# Patient Record
Sex: MALE | Race: WHITE | Employment: FULL TIME
[De-identification: names, ages, dates, MRNs, and addresses within clinical notes are randomized per-mention and may not be internally consistent; named-entity substitution may affect disease eponyms.]

---

## 2017-01-20 PROBLEM — I10 ESSENTIAL HYPERTENSION: Status: ACTIVE | Noted: 2017-01-20

## 2017-01-20 PROBLEM — E10.9 TYPE 1 DIABETES MELLITUS WITHOUT COMPLICATION (HCC): Status: ACTIVE | Noted: 2017-01-20

## 2020-10-14 PROBLEM — N43.40 SPERMATOCELE: Status: ACTIVE | Noted: 2020-10-14

## 2020-12-14 PROBLEM — K43.9 VENTRAL HERNIA WITHOUT OBSTRUCTION OR GANGRENE: Status: ACTIVE | Noted: 2020-12-14

## 2021-03-31 PROBLEM — N50.89 TESTICULAR SWELLING, RIGHT: Status: ACTIVE | Noted: 2021-03-31

## 2021-06-30 PROBLEM — N52.1 ERECTILE DYSFUNCTION DUE TO DISEASES CLASSIFIED ELSEWHERE: Status: ACTIVE | Noted: 2021-06-30

## 2022-09-28 PROBLEM — E10.3293 MILD NONPROLIFERATIVE DIABETIC RETINOPATHY OF BOTH EYES WITHOUT MACULAR EDEMA ASSOCIATED WITH TYPE 1 DIABETES MELLITUS (HCC): Status: ACTIVE | Noted: 2022-09-28

## 2023-05-22 ENCOUNTER — NURSE TRIAGE (OUTPATIENT)
Dept: OTHER | Facility: CLINIC | Age: 63
End: 2023-05-22

## 2023-05-22 NOTE — TELEPHONE ENCOUNTER
Current Symptoms: Pt is trying to reach Nurse Mireya De La Cruz with the Disease Management program. Call was transferred to [facility] Disease Management. Recommended disposition: Information only call. No triage. Care advice provided, patient verbalizes understanding; denies any other questions or concerns; instructed to call back for any new or worsening symptoms. This triage is a result of a call to [facility] [place]. Please do not respond to the triage nurse through this encounter. Any subsequent communication should be directly with the patient. Reason for Disposition   Information only question and nurse able to answer    Protocols used:  Information Only Call - No Triage-ADULT-[place]

## 2025-03-04 ENCOUNTER — HOSPITAL ENCOUNTER (OUTPATIENT)
Dept: HOSPITAL 100 - LAB | Age: 65
Discharge: HOME | End: 2025-03-04
Payer: COMMERCIAL

## 2025-03-04 DIAGNOSIS — L08.9: Primary | ICD-10-CM

## 2025-03-04 DIAGNOSIS — Z96.651: ICD-10-CM

## 2025-03-04 LAB
CRP SERPL-MCNC: 27.2 MG/L (ref 0–3)
DEPRECATED RDW RBC: 41.9 FL (ref 35.1–43.9)
ERYTHROCYTE [DISTWIDTH] IN BLOOD: 13 % (ref 11.6–14.6)
HCT VFR BLD AUTO: 37.2 % (ref 40–54)
HEMOGLOBIN: 12 G/DL (ref 13–16.5)
HGB BLD-MCNC: 12 G/DL (ref 13–16.5)
IMMATURE GRANULOCYTES COUNT: 0.07 X10^3/UL (ref 0–0)
MCV RBC: 87.9 FL (ref 80–94)
MEAN CORP HGB CONC: 32.3 G/DL (ref 32–36)
MEAN PLATELET VOL.: 9.8 FL (ref 6.2–12)
NRBC FLAGGED BY ANALYZER: 0 % (ref 0–5)
PLATELET # BLD: 456 K/MM3 (ref 150–450)
PLATELET COUNT: 456 K/MM3 (ref 150–450)
RBC # BLD AUTO: 4.23 M/MM3 (ref 4.6–6.2)
RBC DISTRIBUTION WIDTH CV: 13 % (ref 11.6–14.6)
RBC DISTRIBUTION WIDTH SD: 41.9 FL (ref 35.1–43.9)
WBC # BLD AUTO: 12.1 K/MM3 (ref 4.4–11)
WHITE BLOOD COUNT: 12.1 K/MM3 (ref 4.4–11)

## 2025-03-04 PROCEDURE — 87070 CULTURE OTHR SPECIMN AEROBIC: CPT

## 2025-03-04 PROCEDURE — 87206 SMEAR FLUORESCENT/ACID STAI: CPT

## 2025-03-04 PROCEDURE — 87205 SMEAR GRAM STAIN: CPT

## 2025-03-04 PROCEDURE — 87015 SPECIMEN INFECT AGNT CONCNTJ: CPT

## 2025-03-04 PROCEDURE — 86140 C-REACTIVE PROTEIN: CPT

## 2025-03-04 PROCEDURE — 85652 RBC SED RATE AUTOMATED: CPT

## 2025-03-04 PROCEDURE — 87116 MYCOBACTERIA CULTURE: CPT

## 2025-03-04 PROCEDURE — 85025 COMPLETE CBC W/AUTO DIFF WBC: CPT

## 2025-03-04 PROCEDURE — 87101 SKIN FUNGI CULTURE: CPT

## 2025-03-04 PROCEDURE — 36415 COLL VENOUS BLD VENIPUNCTURE: CPT

## 2025-03-04 PROCEDURE — 89050 BODY FLUID CELL COUNT: CPT

## 2025-03-04 PROCEDURE — 87075 CULTR BACTERIA EXCEPT BLOOD: CPT

## 2025-03-05 ENCOUNTER — HOSPITAL ENCOUNTER (INPATIENT)
Dept: HOSPITAL 100 - ED | Age: 65
LOS: 6 days | Discharge: HOME HEALTH | DRG: 467 | End: 2025-03-11
Payer: COMMERCIAL

## 2025-03-05 VITALS
OXYGEN SATURATION: 97 % | RESPIRATION RATE: 18 BRPM | TEMPERATURE: 98.8 F | HEART RATE: 85 BPM | DIASTOLIC BLOOD PRESSURE: 75 MMHG | SYSTOLIC BLOOD PRESSURE: 137 MMHG

## 2025-03-05 VITALS — BODY MASS INDEX: 32 KG/M2 | BODY MASS INDEX: 31.4 KG/M2

## 2025-03-05 VITALS
DIASTOLIC BLOOD PRESSURE: 71 MMHG | SYSTOLIC BLOOD PRESSURE: 132 MMHG | OXYGEN SATURATION: 97 % | TEMPERATURE: 98.78 F | RESPIRATION RATE: 16 BRPM | HEART RATE: 86 BPM

## 2025-03-05 VITALS
TEMPERATURE: 98.24 F | SYSTOLIC BLOOD PRESSURE: 132 MMHG | DIASTOLIC BLOOD PRESSURE: 72 MMHG | OXYGEN SATURATION: 94 % | HEART RATE: 91 BPM | RESPIRATION RATE: 16 BRPM

## 2025-03-05 VITALS
OXYGEN SATURATION: 97 % | TEMPERATURE: 98.1 F | RESPIRATION RATE: 16 BRPM | SYSTOLIC BLOOD PRESSURE: 153 MMHG | HEART RATE: 89 BPM | DIASTOLIC BLOOD PRESSURE: 77 MMHG

## 2025-03-05 VITALS
DIASTOLIC BLOOD PRESSURE: 72 MMHG | SYSTOLIC BLOOD PRESSURE: 132 MMHG | OXYGEN SATURATION: 95 % | TEMPERATURE: 98.06 F | HEART RATE: 97 BPM | RESPIRATION RATE: 16 BRPM

## 2025-03-05 VITALS
OXYGEN SATURATION: 98 % | HEART RATE: 90 BPM | SYSTOLIC BLOOD PRESSURE: 142 MMHG | DIASTOLIC BLOOD PRESSURE: 70 MMHG | TEMPERATURE: 98.6 F | RESPIRATION RATE: 18 BRPM

## 2025-03-05 DIAGNOSIS — E66.811: ICD-10-CM

## 2025-03-05 DIAGNOSIS — T84.53XA: Primary | ICD-10-CM

## 2025-03-05 DIAGNOSIS — Z79.4: ICD-10-CM

## 2025-03-05 DIAGNOSIS — Z79.82: ICD-10-CM

## 2025-03-05 DIAGNOSIS — M00.9: ICD-10-CM

## 2025-03-05 DIAGNOSIS — I10: ICD-10-CM

## 2025-03-05 DIAGNOSIS — Z79.1: ICD-10-CM

## 2025-03-05 DIAGNOSIS — Z66: ICD-10-CM

## 2025-03-05 DIAGNOSIS — D62: ICD-10-CM

## 2025-03-05 DIAGNOSIS — E11.9: ICD-10-CM

## 2025-03-05 DIAGNOSIS — E78.00: ICD-10-CM

## 2025-03-05 DIAGNOSIS — K21.9: ICD-10-CM

## 2025-03-05 DIAGNOSIS — M19.90: ICD-10-CM

## 2025-03-05 LAB
ALANINE AMINOTRANSFER ALT/SGPT: 10 U/L (ref ?–46)
ALBUMIN SERPL-MCNC: 3.6 G/DL (ref 3.4–4.8)
ALKALINE PHOSPHATASE: 77 U/L (ref 40–129)
ANION GAP: 13 (ref 5–15)
ANION GAP: 14 (ref 5–15)
APTT PPP: 29.5 SECONDS (ref 24.1–36.2)
AST(SGOT): 17 U/L (ref ?–37)
BUN SERPL-MCNC: 17 MG/DL (ref 4–19)
BUN SERPL-MCNC: 17 MG/DL (ref 4–19)
BUN/CREAT RATIO: 16.6 RATIO (ref 10–20)
BUN/CREAT RATIO: 16.6 RATIO (ref 10–20)
CALCIUM SERPL-MCNC: 9.5 MG/DL (ref 7.6–11)
CALCIUM SERPL-MCNC: 9.9 MG/DL (ref 7.6–11)
CARBON DIOXIDE: 21.6 MMOL/L (ref 21–32)
CARBON DIOXIDE: 24 MMOL/L (ref 21–32)
CHLORIDE: 94 MMOL/L (ref 98–108)
CHLORIDE: 95 MMOL/L (ref 98–108)
DEPRECATED RDW RBC: 41.6 FL (ref 35.1–43.9)
DEPRECATED RDW RBC: 41.9 FL (ref 35.1–43.9)
ERYTHROCYTE [DISTWIDTH] IN BLOOD: 13 % (ref 11.6–14.6)
ERYTHROCYTE [DISTWIDTH] IN BLOOD: 13 % (ref 11.6–14.6)
ESTIMATED CREATININE CLEARANCE: 91.99 ML/MIN (ref 50–250)
GLOBULIN: 3.7 G/DL (ref 2.2–4.2)
GLUCOSE: 188 MG/DL (ref 70–99)
GLUCOSE: 282 MG/DL (ref 70–99)
HCT VFR BLD AUTO: 35.3 % (ref 40–54)
HCT VFR BLD AUTO: 38.6 % (ref 40–54)
HEMOGLOBIN: 11.6 G/DL (ref 13–16.5)
HEMOGLOBIN: 12.5 G/DL (ref 13–16.5)
HGB BLD-MCNC: 11.6 G/DL (ref 13–16.5)
HGB BLD-MCNC: 12.5 G/DL (ref 13–16.5)
IMMATURE GRANULOCYTES COUNT: 0.1 X10^3/UL (ref 0–0)
IMMATURE GRANULOCYTES COUNT: 0.21 X10^3/UL (ref 0–0)
KETONE-DIPSTICK: 15 MG/DL
MCV RBC: 87.6 FL (ref 80–94)
MCV RBC: 87.7 FL (ref 80–94)
MEAN CORP HGB CONC: 32.4 G/DL (ref 32–36)
MEAN CORP HGB CONC: 32.9 G/DL (ref 32–36)
MEAN PLATELET VOL.: 10.1 FL (ref 6.2–12)
MEAN PLATELET VOL.: 10.2 FL (ref 6.2–12)
MUCOUS THREADS URNS QL MICRO: (no result) /HPF
NRBC FLAGGED BY ANALYZER: 0 % (ref 0–5)
NRBC FLAGGED BY ANALYZER: 0 % (ref 0–5)
PLATELET # BLD: 411 K/MM3 (ref 150–450)
PLATELET # BLD: 477 K/MM3 (ref 150–450)
PLATELET COUNT: 411 K/MM3 (ref 150–450)
PLATELET COUNT: 477 K/MM3 (ref 150–450)
POTASSIUM: 4.1 MMOL/L (ref 3.3–5.1)
POTASSIUM: 4.8 MMOL/L (ref 3.3–5.1)
PROT UR QL STRIP.AUTO: 15 MG/DL
PROTHROMBIN TIME (PROTIME)PT.: 13.6 SECONDS (ref 11.7–14.9)
RBC # BLD AUTO: 4.03 M/MM3 (ref 4.6–6.2)
RBC # BLD AUTO: 4.4 M/MM3 (ref 4.6–6.2)
RBC DISTRIBUTION WIDTH CV: 13 % (ref 11.6–14.6)
RBC DISTRIBUTION WIDTH CV: 13 % (ref 11.6–14.6)
RBC DISTRIBUTION WIDTH SD: 41.6 FL (ref 35.1–43.9)
RBC DISTRIBUTION WIDTH SD: 41.9 FL (ref 35.1–43.9)
RBC UR QL: (no result) /HPF (ref 0–5)
SP GR UR: 1.02 (ref 1–1.03)
SQUAMOUS URNS QL MICRO: (no result) /HPF (ref 0–5)
URINE PRESERVATIVE: (no result)
WBC # BLD AUTO: 11.6 K/MM3 (ref 4.4–11)
WBC # BLD AUTO: 12.7 K/MM3 (ref 4.4–11)
WHITE BLOOD COUNT: 11.6 K/MM3 (ref 4.4–11)
WHITE BLOOD COUNT: 12.7 K/MM3 (ref 4.4–11)

## 2025-03-05 PROCEDURE — 36415 COLL VENOUS BLD VENIPUNCTURE: CPT

## 2025-03-05 PROCEDURE — 80053 COMPREHEN METABOLIC PANEL: CPT

## 2025-03-05 PROCEDURE — 87205 SMEAR GRAM STAIN: CPT

## 2025-03-05 PROCEDURE — 94668 MNPJ CHEST WALL SBSQ: CPT

## 2025-03-05 PROCEDURE — 81001 URINALYSIS AUTO W/SCOPE: CPT

## 2025-03-05 PROCEDURE — 87176 TISSUE HOMOGENIZATION CULTR: CPT

## 2025-03-05 PROCEDURE — 87015 SPECIMEN INFECT AGNT CONCNTJ: CPT

## 2025-03-05 PROCEDURE — 97166 OT EVAL MOD COMPLEX 45 MIN: CPT

## 2025-03-05 PROCEDURE — G0463 HOSPITAL OUTPT CLINIC VISIT: HCPCS

## 2025-03-05 PROCEDURE — 87040 BLOOD CULTURE FOR BACTERIA: CPT

## 2025-03-05 PROCEDURE — A4216 STERILE WATER/SALINE, 10 ML: HCPCS

## 2025-03-05 PROCEDURE — 82962 GLUCOSE BLOOD TEST: CPT

## 2025-03-05 PROCEDURE — 87077 CULTURE AEROBIC IDENTIFY: CPT

## 2025-03-05 PROCEDURE — 99285 EMERGENCY DEPT VISIT HI MDM: CPT

## 2025-03-05 PROCEDURE — 88305 TISSUE EXAM BY PATHOLOGIST: CPT

## 2025-03-05 PROCEDURE — 80202 ASSAY OF VANCOMYCIN: CPT

## 2025-03-05 PROCEDURE — 87086 URINE CULTURE/COLONY COUNT: CPT

## 2025-03-05 PROCEDURE — 87070 CULTURE OTHR SPECIMN AEROBIC: CPT

## 2025-03-05 PROCEDURE — 97530 THERAPEUTIC ACTIVITIES: CPT

## 2025-03-05 PROCEDURE — 85610 PROTHROMBIN TIME: CPT

## 2025-03-05 PROCEDURE — 97110 THERAPEUTIC EXERCISES: CPT

## 2025-03-05 PROCEDURE — 36569 INSJ PICC 5 YR+ W/O IMAGING: CPT

## 2025-03-05 PROCEDURE — 99252 IP/OBS CONSLTJ NEW/EST SF 35: CPT

## 2025-03-05 PROCEDURE — 80048 BASIC METABOLIC PNL TOTAL CA: CPT

## 2025-03-05 PROCEDURE — 87116 MYCOBACTERIA CULTURE: CPT

## 2025-03-05 PROCEDURE — 83036 HEMOGLOBIN GLYCOSYLATED A1C: CPT

## 2025-03-05 PROCEDURE — 87206 SMEAR FLUORESCENT/ACID STAI: CPT

## 2025-03-05 PROCEDURE — 85730 THROMBOPLASTIN TIME PARTIAL: CPT

## 2025-03-05 PROCEDURE — C1776 JOINT DEVICE (IMPLANTABLE): HCPCS

## 2025-03-05 PROCEDURE — 93005 ELECTROCARDIOGRAM TRACING: CPT

## 2025-03-05 PROCEDURE — 83605 ASSAY OF LACTIC ACID: CPT

## 2025-03-05 PROCEDURE — 97162 PT EVAL MOD COMPLEX 30 MIN: CPT

## 2025-03-05 PROCEDURE — 87075 CULTR BACTERIA EXCEPT BLOOD: CPT

## 2025-03-05 PROCEDURE — 97802 MEDICAL NUTRITION INDIV IN: CPT

## 2025-03-05 PROCEDURE — 73564 X-RAY EXAM KNEE 4 OR MORE: CPT

## 2025-03-05 PROCEDURE — 85025 COMPLETE CBC W/AUTO DIFF WBC: CPT

## 2025-03-05 PROCEDURE — 87102 FUNGUS ISOLATION CULTURE: CPT

## 2025-03-05 PROCEDURE — 97535 SELF CARE MNGMENT TRAINING: CPT

## 2025-03-05 PROCEDURE — 97116 GAIT TRAINING THERAPY: CPT

## 2025-03-05 RX ADMIN — PIPERACILLIN SODIUM AND TAZOBACTAM SODIUM 12.5 GM: 3; .375 INJECTION, POWDER, LYOPHILIZED, FOR SOLUTION INTRAVENOUS at 21:18

## 2025-03-05 RX ADMIN — PIPERACILLIN SODIUM AND TAZOBACTAM SODIUM 100 GM: 3; .375 INJECTION, POWDER, LYOPHILIZED, FOR SOLUTION INTRAVENOUS at 12:22

## 2025-03-05 RX ADMIN — INSULIN GLARGINE-YFGN 55 UNIT: 100 INJECTION, SOLUTION SUBCUTANEOUS at 21:41

## 2025-03-05 RX ADMIN — VANCOMYCIN HYDROCHLORIDE 250 MG: 1 INJECTION, POWDER, LYOPHILIZED, FOR SOLUTION INTRAVENOUS at 13:26

## 2025-03-06 VITALS
HEART RATE: 79 BPM | DIASTOLIC BLOOD PRESSURE: 72 MMHG | SYSTOLIC BLOOD PRESSURE: 128 MMHG | OXYGEN SATURATION: 98 % | RESPIRATION RATE: 16 BRPM

## 2025-03-06 VITALS
HEART RATE: 81 BPM | DIASTOLIC BLOOD PRESSURE: 72 MMHG | OXYGEN SATURATION: 98 % | SYSTOLIC BLOOD PRESSURE: 126 MMHG | RESPIRATION RATE: 16 BRPM

## 2025-03-06 VITALS
SYSTOLIC BLOOD PRESSURE: 137 MMHG | TEMPERATURE: 98.42 F | OXYGEN SATURATION: 94 % | HEART RATE: 81 BPM | DIASTOLIC BLOOD PRESSURE: 74 MMHG | RESPIRATION RATE: 16 BRPM

## 2025-03-06 VITALS
RESPIRATION RATE: 16 BRPM | DIASTOLIC BLOOD PRESSURE: 72 MMHG | HEART RATE: 87 BPM | OXYGEN SATURATION: 97 % | TEMPERATURE: 98.6 F | SYSTOLIC BLOOD PRESSURE: 128 MMHG

## 2025-03-06 VITALS
RESPIRATION RATE: 16 BRPM | DIASTOLIC BLOOD PRESSURE: 72 MMHG | HEART RATE: 82 BPM | TEMPERATURE: 98.06 F | OXYGEN SATURATION: 100 % | SYSTOLIC BLOOD PRESSURE: 134 MMHG

## 2025-03-06 VITALS
SYSTOLIC BLOOD PRESSURE: 140 MMHG | RESPIRATION RATE: 16 BRPM | OXYGEN SATURATION: 99 % | TEMPERATURE: 97.8 F | DIASTOLIC BLOOD PRESSURE: 74 MMHG | HEART RATE: 74 BPM

## 2025-03-06 VITALS
SYSTOLIC BLOOD PRESSURE: 108 MMHG | TEMPERATURE: 98.6 F | RESPIRATION RATE: 16 BRPM | DIASTOLIC BLOOD PRESSURE: 64 MMHG | OXYGEN SATURATION: 98 % | HEART RATE: 77 BPM

## 2025-03-06 VITALS
OXYGEN SATURATION: 97 % | HEART RATE: 76 BPM | DIASTOLIC BLOOD PRESSURE: 72 MMHG | TEMPERATURE: 96.3 F | HEART RATE: 73 BPM | DIASTOLIC BLOOD PRESSURE: 64 MMHG | RESPIRATION RATE: 18 BRPM | OXYGEN SATURATION: 98 % | RESPIRATION RATE: 16 BRPM | SYSTOLIC BLOOD PRESSURE: 128 MMHG | SYSTOLIC BLOOD PRESSURE: 108 MMHG

## 2025-03-06 VITALS
DIASTOLIC BLOOD PRESSURE: 72 MMHG | HEART RATE: 87 BPM | TEMPERATURE: 98.6 F | OXYGEN SATURATION: 97 % | SYSTOLIC BLOOD PRESSURE: 128 MMHG | RESPIRATION RATE: 16 BRPM

## 2025-03-06 VITALS
DIASTOLIC BLOOD PRESSURE: 72 MMHG | RESPIRATION RATE: 16 BRPM | OXYGEN SATURATION: 97 % | SYSTOLIC BLOOD PRESSURE: 128 MMHG | HEART RATE: 74 BPM

## 2025-03-06 VITALS
DIASTOLIC BLOOD PRESSURE: 91 MMHG | SYSTOLIC BLOOD PRESSURE: 158 MMHG | RESPIRATION RATE: 16 BRPM | HEART RATE: 97 BPM | OXYGEN SATURATION: 97 % | TEMPERATURE: 98.24 F

## 2025-03-06 VITALS
RESPIRATION RATE: 16 BRPM | SYSTOLIC BLOOD PRESSURE: 122 MMHG | DIASTOLIC BLOOD PRESSURE: 72 MMHG | HEART RATE: 75 BPM | OXYGEN SATURATION: 98 %

## 2025-03-06 VITALS
OXYGEN SATURATION: 97 % | HEART RATE: 80 BPM | SYSTOLIC BLOOD PRESSURE: 126 MMHG | TEMPERATURE: 98.5 F | RESPIRATION RATE: 18 BRPM | DIASTOLIC BLOOD PRESSURE: 70 MMHG

## 2025-03-06 VITALS
DIASTOLIC BLOOD PRESSURE: 72 MMHG | OXYGEN SATURATION: 98 % | RESPIRATION RATE: 16 BRPM | HEART RATE: 74 BPM | SYSTOLIC BLOOD PRESSURE: 128 MMHG

## 2025-03-06 LAB
ANION GAP: 11 (ref 5–15)
BUN SERPL-MCNC: 15 MG/DL (ref 4–19)
BUN/CREAT RATIO: 14.5 RATIO (ref 10–20)
CALCIUM SERPL-MCNC: 9.3 MG/DL (ref 7.6–11)
CARBON DIOXIDE: 25 MMOL/L (ref 21–32)
CHLORIDE: 97 MMOL/L (ref 98–108)
DEPRECATED RDW RBC: 42.2 FL (ref 35.1–43.9)
DIFFERENTIAL INDICATED: (no result)
ERYTHROCYTE [DISTWIDTH] IN BLOOD: 13.2 % (ref 11.6–14.6)
ESTIMATED CREATININE CLEARANCE: 88.55 ML/MIN (ref 50–250)
GLUCOSE: 199 MG/DL (ref 70–99)
HCT VFR BLD AUTO: 34.8 % (ref 40–54)
HEMOGLOBIN: 11.4 G/DL (ref 13–16.5)
HGB BLD-MCNC: 11.4 G/DL (ref 13–16.5)
IMMATURE GRANULOCYTES COUNT: 0.06 X10^3/UL (ref 0–0)
MCV RBC: 87.4 FL (ref 80–94)
MEAN CORP HGB CONC: 32.8 G/DL (ref 32–36)
MEAN PLATELET VOL.: 9.8 FL (ref 6.2–12)
NRBC FLAGGED BY ANALYZER: 0 % (ref 0–5)
PLATELET # BLD: 384 K/MM3 (ref 150–450)
PLATELET COUNT: 384 K/MM3 (ref 150–450)
POTASSIUM: 4.6 MMOL/L (ref 3.3–5.1)
RBC # BLD AUTO: 3.98 M/MM3 (ref 4.6–6.2)
RBC DISTRIBUTION WIDTH CV: 13.2 % (ref 11.6–14.6)
RBC DISTRIBUTION WIDTH SD: 42.2 FL (ref 35.1–43.9)
WBC # BLD AUTO: 10.9 K/MM3 (ref 4.4–11)
WHITE BLOOD COUNT: 10.9 K/MM3 (ref 4.4–11)

## 2025-03-06 PROCEDURE — 0SRC0JZ REPLACEMENT OF RIGHT KNEE JOINT WITH SYNTHETIC SUBSTITUTE, OPEN APPROACH: ICD-10-PCS | Performed by: ORTHOPAEDIC SURGERY

## 2025-03-06 RX ADMIN — INSULIN GLARGINE-YFGN 55 UNIT: 100 INJECTION, SOLUTION SUBCUTANEOUS at 21:47

## 2025-03-06 RX ADMIN — PIPERACILLIN SODIUM AND TAZOBACTAM SODIUM 12.5 GM: 3; .375 INJECTION, POWDER, LYOPHILIZED, FOR SOLUTION INTRAVENOUS at 19:22

## 2025-03-06 RX ADMIN — VANCOMYCIN HYDROCHLORIDE 1000 MG: 1 INJECTION, POWDER, LYOPHILIZED, FOR SOLUTION INTRAVENOUS at 15:30

## 2025-03-06 RX ADMIN — VANCOMYCIN HYDROCHLORIDE 250 MG: 1 INJECTION, POWDER, LYOPHILIZED, FOR SOLUTION INTRAVENOUS at 01:39

## 2025-03-06 RX ADMIN — PIPERACILLIN SODIUM AND TAZOBACTAM SODIUM 12.5 GM: 3; .375 INJECTION, POWDER, LYOPHILIZED, FOR SOLUTION INTRAVENOUS at 06:05

## 2025-03-06 RX ADMIN — TRANEXAMIC ACID 0 MG: 100 INJECTION, SOLUTION INTRAVENOUS at 14:42

## 2025-03-06 RX ADMIN — VANCOMYCIN HYDROCHLORIDE 250 MG: 1 INJECTION, POWDER, LYOPHILIZED, FOR SOLUTION INTRAVENOUS at 14:03

## 2025-03-06 RX ADMIN — SODIUM CHLORIDE, PRESERVATIVE FREE 0 ML: 5 INJECTION INTRAVENOUS at 23:09

## 2025-03-06 RX ADMIN — PIPERACILLIN SODIUM AND TAZOBACTAM SODIUM 12.5 GM: 3; .375 INJECTION, POWDER, LYOPHILIZED, FOR SOLUTION INTRAVENOUS at 23:08

## 2025-03-07 VITALS
DIASTOLIC BLOOD PRESSURE: 73 MMHG | HEART RATE: 94 BPM | SYSTOLIC BLOOD PRESSURE: 137 MMHG | RESPIRATION RATE: 16 BRPM | TEMPERATURE: 98.42 F | OXYGEN SATURATION: 94 %

## 2025-03-07 VITALS
HEART RATE: 87 BPM | DIASTOLIC BLOOD PRESSURE: 67 MMHG | TEMPERATURE: 98.1 F | RESPIRATION RATE: 18 BRPM | SYSTOLIC BLOOD PRESSURE: 122 MMHG | OXYGEN SATURATION: 97 %

## 2025-03-07 VITALS
SYSTOLIC BLOOD PRESSURE: 134 MMHG | HEART RATE: 84 BPM | TEMPERATURE: 97.7 F | RESPIRATION RATE: 18 BRPM | OXYGEN SATURATION: 100 % | DIASTOLIC BLOOD PRESSURE: 72 MMHG

## 2025-03-07 VITALS
OXYGEN SATURATION: 98 % | HEART RATE: 85 BPM | DIASTOLIC BLOOD PRESSURE: 68 MMHG | RESPIRATION RATE: 18 BRPM | SYSTOLIC BLOOD PRESSURE: 141 MMHG | TEMPERATURE: 97.8 F

## 2025-03-07 VITALS
RESPIRATION RATE: 18 BRPM | HEART RATE: 88 BPM | OXYGEN SATURATION: 100 % | TEMPERATURE: 98.78 F | DIASTOLIC BLOOD PRESSURE: 75 MMHG | SYSTOLIC BLOOD PRESSURE: 130 MMHG

## 2025-03-07 VITALS
TEMPERATURE: 98 F | DIASTOLIC BLOOD PRESSURE: 67 MMHG | HEART RATE: 82 BPM | RESPIRATION RATE: 16 BRPM | SYSTOLIC BLOOD PRESSURE: 142 MMHG | OXYGEN SATURATION: 95 %

## 2025-03-07 LAB
ANION GAP: 13 (ref 5–15)
BUN SERPL-MCNC: 18 MG/DL (ref 4–19)
BUN/CREAT RATIO: 15.1 RATIO (ref 10–20)
CALCIUM SERPL-MCNC: 8.9 MG/DL (ref 7.6–11)
CARBON DIOXIDE: 23.3 MMOL/L (ref 21–32)
CHLORIDE: 98 MMOL/L (ref 98–108)
DEPRECATED RDW RBC: 41.2 FL (ref 35.1–43.9)
ERYTHROCYTE [DISTWIDTH] IN BLOOD: 12.8 % (ref 11.6–14.6)
ESTIMATED CREATININE CLEARANCE: 78.62 ML/MIN (ref 50–250)
GLUCOSE: 247 MG/DL (ref 70–99)
HCT VFR BLD AUTO: 33.9 % (ref 40–54)
HEMOGLOBIN: 11.1 G/DL (ref 13–16.5)
HGB BLD-MCNC: 11.1 G/DL (ref 13–16.5)
IMMATURE GRANULOCYTES COUNT: 0.06 X10^3/UL (ref 0–0)
MCV RBC: 87.8 FL (ref 80–94)
MEAN CORP HGB CONC: 32.7 G/DL (ref 32–36)
MEAN PLATELET VOL.: 10.3 FL (ref 6.2–12)
NRBC FLAGGED BY ANALYZER: 0 % (ref 0–5)
PLATELET # BLD: 416 K/MM3 (ref 150–450)
PLATELET COUNT: 416 K/MM3 (ref 150–450)
POTASSIUM: 4.5 MMOL/L (ref 3.3–5.1)
RBC # BLD AUTO: 3.86 M/MM3 (ref 4.6–6.2)
RBC DISTRIBUTION WIDTH CV: 12.8 % (ref 11.6–14.6)
RBC DISTRIBUTION WIDTH SD: 41.2 FL (ref 35.1–43.9)
VANCOMYCIN TROUGH SERPL-MCNC: 18.5 UG/ML (ref 5–15)
WBC # BLD AUTO: 13.1 K/MM3 (ref 4.4–11)
WHITE BLOOD COUNT: 13.1 K/MM3 (ref 4.4–11)

## 2025-03-07 RX ADMIN — PIPERACILLIN SODIUM AND TAZOBACTAM SODIUM 12.5 GM: 3; .375 INJECTION, POWDER, LYOPHILIZED, FOR SOLUTION INTRAVENOUS at 14:57

## 2025-03-07 RX ADMIN — PIPERACILLIN SODIUM AND TAZOBACTAM SODIUM 12.5 GM: 3; .375 INJECTION, POWDER, LYOPHILIZED, FOR SOLUTION INTRAVENOUS at 06:16

## 2025-03-07 RX ADMIN — VANCOMYCIN HYDROCHLORIDE 250 MG: 1 INJECTION, POWDER, LYOPHILIZED, FOR SOLUTION INTRAVENOUS at 01:41

## 2025-03-07 RX ADMIN — SODIUM CHLORIDE, PRESERVATIVE FREE 0 ML: 5 INJECTION INTRAVENOUS at 13:14

## 2025-03-07 RX ADMIN — PIPERACILLIN SODIUM AND TAZOBACTAM SODIUM 12.5 GM: 3; .375 INJECTION, POWDER, LYOPHILIZED, FOR SOLUTION INTRAVENOUS at 21:04

## 2025-03-07 RX ADMIN — SODIUM CHLORIDE 15 ML: 9 INJECTION, SOLUTION INTRAVENOUS at 01:42

## 2025-03-07 RX ADMIN — VANCOMYCIN HYDROCHLORIDE 250 MG: 1 INJECTION, POWDER, LYOPHILIZED, FOR SOLUTION INTRAVENOUS at 13:14

## 2025-03-07 RX ADMIN — SODIUM CHLORIDE, PRESERVATIVE FREE 0 ML: 5 INJECTION INTRAVENOUS at 01:43

## 2025-03-08 VITALS
RESPIRATION RATE: 16 BRPM | HEART RATE: 88 BPM | SYSTOLIC BLOOD PRESSURE: 138 MMHG | DIASTOLIC BLOOD PRESSURE: 75 MMHG | OXYGEN SATURATION: 98 % | TEMPERATURE: 98.2 F

## 2025-03-08 VITALS
OXYGEN SATURATION: 99 % | SYSTOLIC BLOOD PRESSURE: 131 MMHG | TEMPERATURE: 97.7 F | DIASTOLIC BLOOD PRESSURE: 76 MMHG | HEART RATE: 84 BPM | RESPIRATION RATE: 18 BRPM

## 2025-03-08 VITALS
TEMPERATURE: 98.3 F | RESPIRATION RATE: 16 BRPM | SYSTOLIC BLOOD PRESSURE: 127 MMHG | HEART RATE: 79 BPM | OXYGEN SATURATION: 97 % | DIASTOLIC BLOOD PRESSURE: 71 MMHG

## 2025-03-08 VITALS
DIASTOLIC BLOOD PRESSURE: 80 MMHG | HEART RATE: 84 BPM | SYSTOLIC BLOOD PRESSURE: 143 MMHG | OXYGEN SATURATION: 95 % | TEMPERATURE: 97.88 F | RESPIRATION RATE: 18 BRPM

## 2025-03-08 VITALS — OXYGEN SATURATION: 95 %

## 2025-03-08 LAB
ANION GAP: 13 (ref 5–15)
BUN SERPL-MCNC: 17 MG/DL (ref 4–19)
BUN/CREAT RATIO: 14.6 RATIO (ref 10–20)
CALCIUM SERPL-MCNC: 8.6 MG/DL (ref 7.6–11)
CARBON DIOXIDE: 21.4 MMOL/L (ref 21–32)
CHLORIDE: 101 MMOL/L (ref 98–108)
DEPRECATED RDW RBC: 41.4 FL (ref 35.1–43.9)
ERYTHROCYTE [DISTWIDTH] IN BLOOD: 13 % (ref 11.6–14.6)
ESTIMATED CREATININE CLEARANCE: 80 ML/MIN (ref 50–250)
GLUCOSE: 219 MG/DL (ref 70–99)
HCT VFR BLD AUTO: 29.7 % (ref 40–54)
HEMOGLOBIN: 9.7 G/DL (ref 13–16.5)
HGB BLD-MCNC: 9.7 G/DL (ref 13–16.5)
IMMATURE GRANULOCYTES COUNT: 0.06 X10^3/UL (ref 0–0)
MCV RBC: 87.4 FL (ref 80–94)
MEAN CORP HGB CONC: 32.7 G/DL (ref 32–36)
MEAN PLATELET VOL.: 10.5 FL (ref 6.2–12)
NRBC FLAGGED BY ANALYZER: 0 % (ref 0–5)
PLATELET # BLD: 354 K/MM3 (ref 150–450)
PLATELET COUNT: 354 K/MM3 (ref 150–450)
POTASSIUM: 4.5 MMOL/L (ref 3.3–5.1)
RBC # BLD AUTO: 3.4 M/MM3 (ref 4.6–6.2)
RBC DISTRIBUTION WIDTH CV: 13 % (ref 11.6–14.6)
RBC DISTRIBUTION WIDTH SD: 41.4 FL (ref 35.1–43.9)
WBC # BLD AUTO: 8.8 K/MM3 (ref 4.4–11)
WHITE BLOOD COUNT: 8.8 K/MM3 (ref 4.4–11)

## 2025-03-08 RX ADMIN — PIPERACILLIN SODIUM AND TAZOBACTAM SODIUM 12.5 GM: 3; .375 INJECTION, POWDER, LYOPHILIZED, FOR SOLUTION INTRAVENOUS at 05:15

## 2025-03-08 RX ADMIN — VANCOMYCIN HYDROCHLORIDE 250 MG: 1 INJECTION, POWDER, LYOPHILIZED, FOR SOLUTION INTRAVENOUS at 13:22

## 2025-03-08 RX ADMIN — PIPERACILLIN SODIUM AND TAZOBACTAM SODIUM 12.5 GM: 3; .375 INJECTION, POWDER, LYOPHILIZED, FOR SOLUTION INTRAVENOUS at 20:43

## 2025-03-08 RX ADMIN — SODIUM CHLORIDE, PRESERVATIVE FREE 0 ML: 5 INJECTION INTRAVENOUS at 13:22

## 2025-03-08 RX ADMIN — INSULIN GLARGINE-YFGN 55 UNIT: 100 INJECTION, SOLUTION SUBCUTANEOUS at 20:42

## 2025-03-08 RX ADMIN — VANCOMYCIN HYDROCHLORIDE 250 MG: 1 INJECTION, POWDER, LYOPHILIZED, FOR SOLUTION INTRAVENOUS at 01:11

## 2025-03-08 RX ADMIN — PIPERACILLIN SODIUM AND TAZOBACTAM SODIUM 12.5 GM: 3; .375 INJECTION, POWDER, LYOPHILIZED, FOR SOLUTION INTRAVENOUS at 13:22

## 2025-03-09 VITALS
HEART RATE: 75 BPM | TEMPERATURE: 98.9 F | RESPIRATION RATE: 18 BRPM | DIASTOLIC BLOOD PRESSURE: 71 MMHG | OXYGEN SATURATION: 98 % | SYSTOLIC BLOOD PRESSURE: 136 MMHG

## 2025-03-09 VITALS
TEMPERATURE: 98.06 F | HEART RATE: 90 BPM | SYSTOLIC BLOOD PRESSURE: 150 MMHG | RESPIRATION RATE: 16 BRPM | DIASTOLIC BLOOD PRESSURE: 73 MMHG | OXYGEN SATURATION: 96 %

## 2025-03-09 VITALS
HEART RATE: 82 BPM | TEMPERATURE: 98.2 F | RESPIRATION RATE: 16 BRPM | OXYGEN SATURATION: 98 % | SYSTOLIC BLOOD PRESSURE: 126 MMHG | DIASTOLIC BLOOD PRESSURE: 76 MMHG

## 2025-03-09 VITALS
DIASTOLIC BLOOD PRESSURE: 75 MMHG | SYSTOLIC BLOOD PRESSURE: 137 MMHG | HEART RATE: 78 BPM | OXYGEN SATURATION: 99 % | TEMPERATURE: 98.24 F | RESPIRATION RATE: 16 BRPM

## 2025-03-09 LAB
ANION GAP: 12 (ref 5–15)
BUN SERPL-MCNC: 14 MG/DL (ref 4–19)
BUN/CREAT RATIO: 13.3 RATIO (ref 10–20)
CALCIUM SERPL-MCNC: 9.1 MG/DL (ref 7.6–11)
CARBON DIOXIDE: 23.5 MMOL/L (ref 21–32)
CHLORIDE: 97 MMOL/L (ref 98–108)
DEPRECATED RDW RBC: 40.7 FL (ref 35.1–43.9)
ERYTHROCYTE [DISTWIDTH] IN BLOOD: 13 % (ref 11.6–14.6)
ESTIMATED CREATININE CLEARANCE: 85.24 ML/MIN (ref 50–250)
GLUCOSE: 209 MG/DL (ref 70–99)
HCT VFR BLD AUTO: 31.1 % (ref 40–54)
HEMOGLOBIN: 10.2 G/DL (ref 13–16.5)
HGB BLD-MCNC: 10.2 G/DL (ref 13–16.5)
IMMATURE GRANULOCYTES COUNT: 0.05 X10^3/UL (ref 0–0)
MCV RBC: 86.9 FL (ref 80–94)
MEAN CORP HGB CONC: 32.8 G/DL (ref 32–36)
MEAN PLATELET VOL.: 10.5 FL (ref 6.2–12)
NRBC FLAGGED BY ANALYZER: 0 % (ref 0–5)
PLATELET # BLD: 373 K/MM3 (ref 150–450)
PLATELET COUNT: 373 K/MM3 (ref 150–450)
POTASSIUM: 4.2 MMOL/L (ref 3.3–5.1)
RBC # BLD AUTO: 3.58 M/MM3 (ref 4.6–6.2)
RBC DISTRIBUTION WIDTH CV: 13 % (ref 11.6–14.6)
RBC DISTRIBUTION WIDTH SD: 40.7 FL (ref 35.1–43.9)
VANCOMYCIN SERPL-MCNC: 17 UG/ML (ref 0–15)
VANCOMYCIN TROUGH SERPL-MCNC: 23.8 UG/ML (ref 5–15)
WBC # BLD AUTO: 9.4 K/MM3 (ref 4.4–11)
WHITE BLOOD COUNT: 9.4 K/MM3 (ref 4.4–11)

## 2025-03-09 RX ADMIN — SODIUM CHLORIDE, PRESERVATIVE FREE 0 ML: 5 INJECTION INTRAVENOUS at 10:52

## 2025-03-09 RX ADMIN — SODIUM CHLORIDE 1 ML: 9 INJECTION, SOLUTION INTRAVENOUS at 13:53

## 2025-03-09 RX ADMIN — PIPERACILLIN SODIUM AND TAZOBACTAM SODIUM 12.5 GM: 3; .375 INJECTION, POWDER, LYOPHILIZED, FOR SOLUTION INTRAVENOUS at 22:17

## 2025-03-09 RX ADMIN — INSULIN GLARGINE-YFGN 55 UNIT: 100 INJECTION, SOLUTION SUBCUTANEOUS at 22:15

## 2025-03-09 RX ADMIN — PIPERACILLIN SODIUM AND TAZOBACTAM SODIUM 12.5 GM: 3; .375 INJECTION, POWDER, LYOPHILIZED, FOR SOLUTION INTRAVENOUS at 13:53

## 2025-03-09 RX ADMIN — PIPERACILLIN SODIUM AND TAZOBACTAM SODIUM 12.5 GM: 3; .375 INJECTION, POWDER, LYOPHILIZED, FOR SOLUTION INTRAVENOUS at 05:29

## 2025-03-09 RX ADMIN — SODIUM CHLORIDE, PRESERVATIVE FREE 0 ML: 5 INJECTION INTRAVENOUS at 13:53

## 2025-03-09 RX ADMIN — VANCOMYCIN HYDROCHLORIDE 250 MG: 1 INJECTION, POWDER, LYOPHILIZED, FOR SOLUTION INTRAVENOUS at 13:52

## 2025-03-10 VITALS
SYSTOLIC BLOOD PRESSURE: 135 MMHG | OXYGEN SATURATION: 97 % | HEART RATE: 87 BPM | DIASTOLIC BLOOD PRESSURE: 79 MMHG | RESPIRATION RATE: 16 BRPM | TEMPERATURE: 98.2 F

## 2025-03-10 VITALS
DIASTOLIC BLOOD PRESSURE: 80 MMHG | TEMPERATURE: 98 F | RESPIRATION RATE: 16 BRPM | HEART RATE: 88 BPM | SYSTOLIC BLOOD PRESSURE: 143 MMHG | OXYGEN SATURATION: 97 %

## 2025-03-10 VITALS
OXYGEN SATURATION: 98 % | TEMPERATURE: 97.88 F | SYSTOLIC BLOOD PRESSURE: 153 MMHG | DIASTOLIC BLOOD PRESSURE: 83 MMHG | HEART RATE: 81 BPM | RESPIRATION RATE: 14 BRPM

## 2025-03-10 VITALS
OXYGEN SATURATION: 98 % | DIASTOLIC BLOOD PRESSURE: 87 MMHG | RESPIRATION RATE: 16 BRPM | SYSTOLIC BLOOD PRESSURE: 151 MMHG | HEART RATE: 84 BPM | TEMPERATURE: 97.88 F

## 2025-03-10 LAB
ANION GAP: 13 (ref 5–15)
BUN SERPL-MCNC: 19 MG/DL (ref 4–19)
BUN/CREAT RATIO: 18.7 RATIO (ref 10–20)
CALCIUM SERPL-MCNC: 9 MG/DL (ref 7.6–11)
CARBON DIOXIDE: 22.6 MMOL/L (ref 21–32)
CHLORIDE: 99 MMOL/L (ref 98–108)
DEPRECATED RDW RBC: 41.4 FL (ref 35.1–43.9)
ERYTHROCYTE [DISTWIDTH] IN BLOOD: 13 % (ref 11.6–14.6)
ESTIMATED CREATININE CLEARANCE: 91.2 ML/MIN (ref 50–250)
GLUCOSE: 182 MG/DL (ref 70–99)
HCT VFR BLD AUTO: 31.7 % (ref 40–54)
HEMOGLOBIN: 10.3 G/DL (ref 13–16.5)
HGB BLD-MCNC: 10.3 G/DL (ref 13–16.5)
IMMATURE GRANULOCYTES COUNT: 0.07 X10^3/UL (ref 0–0)
MCV RBC: 87.1 FL (ref 80–94)
MEAN CORP HGB CONC: 32.5 G/DL (ref 32–36)
MEAN PLATELET VOL.: 10.3 FL (ref 6.2–12)
NRBC FLAGGED BY ANALYZER: 0 % (ref 0–5)
PLATELET # BLD: 355 K/MM3 (ref 150–450)
PLATELET COUNT: 355 K/MM3 (ref 150–450)
POTASSIUM: 4.1 MMOL/L (ref 3.3–5.1)
RBC # BLD AUTO: 3.64 M/MM3 (ref 4.6–6.2)
RBC DISTRIBUTION WIDTH CV: 13 % (ref 11.6–14.6)
RBC DISTRIBUTION WIDTH SD: 41.4 FL (ref 35.1–43.9)
WBC # BLD AUTO: 9.5 K/MM3 (ref 4.4–11)
WHITE BLOOD COUNT: 9.5 K/MM3 (ref 4.4–11)

## 2025-03-10 RX ADMIN — VANCOMYCIN HYDROCHLORIDE 250 MG: 1 INJECTION, POWDER, LYOPHILIZED, FOR SOLUTION INTRAVENOUS at 15:10

## 2025-03-10 RX ADMIN — PIPERACILLIN SODIUM AND TAZOBACTAM SODIUM 12.5 GM: 3; .375 INJECTION, POWDER, LYOPHILIZED, FOR SOLUTION INTRAVENOUS at 06:29

## 2025-03-10 RX ADMIN — INSULIN GLARGINE-YFGN 55 UNIT: 100 INJECTION, SOLUTION SUBCUTANEOUS at 22:13

## 2025-03-10 RX ADMIN — VANCOMYCIN HYDROCHLORIDE 250 MG: 1 INJECTION, POWDER, LYOPHILIZED, FOR SOLUTION INTRAVENOUS at 01:46

## 2025-03-11 VITALS
OXYGEN SATURATION: 97 % | TEMPERATURE: 98.4 F | DIASTOLIC BLOOD PRESSURE: 79 MMHG | RESPIRATION RATE: 15 BRPM | HEART RATE: 76 BPM | SYSTOLIC BLOOD PRESSURE: 132 MMHG

## 2025-03-11 VITALS
SYSTOLIC BLOOD PRESSURE: 143 MMHG | HEART RATE: 82 BPM | OXYGEN SATURATION: 97 % | DIASTOLIC BLOOD PRESSURE: 80 MMHG | RESPIRATION RATE: 16 BRPM | TEMPERATURE: 97.88 F

## 2025-03-11 VITALS
OXYGEN SATURATION: 96 % | RESPIRATION RATE: 18 BRPM | HEART RATE: 88 BPM | DIASTOLIC BLOOD PRESSURE: 70 MMHG | SYSTOLIC BLOOD PRESSURE: 143 MMHG | TEMPERATURE: 98.06 F

## 2025-03-11 LAB
VANCOMYCIN SERPL-MCNC: 13.7 UG/ML (ref 0–15)
VANCOMYCIN TROUGH SERPL-MCNC: 21.3 UG/ML (ref 5–15)

## 2025-03-11 RX ADMIN — SODIUM CHLORIDE, PRESERVATIVE FREE 0 ML: 5 INJECTION INTRAVENOUS at 05:40

## 2025-03-11 RX ADMIN — CEFTRIAXONE SODIUM 100 GM: 2 INJECTION, POWDER, FOR SOLUTION INTRAMUSCULAR; INTRAVENOUS at 10:13

## 2025-03-11 RX ADMIN — SODIUM CHLORIDE, PRESERVATIVE FREE 0 ML: 5 INJECTION INTRAVENOUS at 10:15

## 2025-03-17 ENCOUNTER — HOSPITAL ENCOUNTER (OUTPATIENT)
Dept: HOSPITAL 100 - HHLAB | Age: 65
LOS: 14 days | Discharge: HOME | End: 2025-03-31
Payer: COMMERCIAL

## 2025-03-17 DIAGNOSIS — T84.50XA: Primary | ICD-10-CM

## 2025-03-17 LAB
ANION GAP: 13 (ref 5–15)
BUN SERPL-MCNC: 26 MG/DL (ref 4–19)
BUN/CREAT RATIO: 23.7 RATIO (ref 10–20)
CALCIUM SERPL-MCNC: 9.3 MG/DL (ref 7.6–11)
CARBON DIOXIDE: 23.9 MMOL/L (ref 21–32)
CHLORIDE: 98 MMOL/L (ref 98–108)
DEPRECATED RDW RBC: 44.9 FL (ref 35.1–43.9)
ERYTHROCYTE [DISTWIDTH] IN BLOOD: 13.8 % (ref 11.6–14.6)
GLUCOSE: 92 MG/DL (ref 70–99)
HCT VFR BLD AUTO: 33.9 % (ref 40–54)
HEMOGLOBIN: 10.8 G/DL (ref 13–16.5)
HGB BLD-MCNC: 10.8 G/DL (ref 13–16.5)
MCV RBC: 89.4 FL (ref 80–94)
MEAN CORP HGB CONC: 31.9 G/DL (ref 32–36)
MEAN PLATELET VOL.: 11.7 FL (ref 6.2–12)
PLATELET # BLD: 303 K/MM3 (ref 150–450)
PLATELET COUNT: 303 K/MM3 (ref 150–450)
POTASSIUM: 4.2 MMOL/L (ref 3.3–5.1)
RBC # BLD AUTO: 3.79 M/MM3 (ref 4.6–6.2)
RBC DISTRIBUTION WIDTH CV: 13.8 % (ref 11.6–14.6)
RBC DISTRIBUTION WIDTH SD: 44.9 FL (ref 35.1–43.9)
WBC # BLD AUTO: 10.5 K/MM3 (ref 4.4–11)
WHITE BLOOD COUNT: 10.5 K/MM3 (ref 4.4–11)

## 2025-03-17 PROCEDURE — 85027 COMPLETE CBC AUTOMATED: CPT

## 2025-03-17 PROCEDURE — 80048 BASIC METABOLIC PNL TOTAL CA: CPT

## 2025-03-17 PROCEDURE — 85652 RBC SED RATE AUTOMATED: CPT

## 2025-03-24 ENCOUNTER — HOSPITAL ENCOUNTER (OUTPATIENT)
Dept: HOSPITAL 100 - LABSPEC | Age: 65
Discharge: HOME | End: 2025-03-24
Payer: COMMERCIAL

## 2025-03-24 DIAGNOSIS — T84.50XA: Primary | ICD-10-CM

## 2025-03-24 LAB
ANION GAP: 14 (ref 5–15)
BUN SERPL-MCNC: 24 MG/DL (ref 4–19)
BUN/CREAT RATIO: 23.5 RATIO (ref 10–20)
CALCIUM SERPL-MCNC: 9.2 MG/DL (ref 7.6–11)
CARBON DIOXIDE: 23 MMOL/L (ref 21–32)
CHLORIDE: 97 MMOL/L (ref 98–108)
DEPRECATED RDW RBC: 46.7 FL (ref 35.1–43.9)
ERYTHROCYTE [DISTWIDTH] IN BLOOD: 14.4 % (ref 11.6–14.6)
GLUCOSE: 128 MG/DL (ref 70–99)
HCT VFR BLD AUTO: 34 % (ref 40–54)
HEMOGLOBIN: 10.8 G/DL (ref 13–16.5)
HGB BLD-MCNC: 10.8 G/DL (ref 13–16.5)
MCV RBC: 88.5 FL (ref 80–94)
MEAN CORP HGB CONC: 31.8 G/DL (ref 32–36)
MEAN PLATELET VOL.: 12.1 FL (ref 6.2–12)
PLATELET # BLD: 210 K/MM3 (ref 150–450)
PLATELET COUNT: 210 K/MM3 (ref 150–450)
POTASSIUM: 4.5 MMOL/L (ref 3.3–5.1)
RBC # BLD AUTO: 3.84 M/MM3 (ref 4.6–6.2)
RBC DISTRIBUTION WIDTH CV: 14.4 % (ref 11.6–14.6)
RBC DISTRIBUTION WIDTH SD: 46.7 FL (ref 35.1–43.9)
WBC # BLD AUTO: 5.4 K/MM3 (ref 4.4–11)
WHITE BLOOD COUNT: 5.4 K/MM3 (ref 4.4–11)

## 2025-03-24 PROCEDURE — 85027 COMPLETE CBC AUTOMATED: CPT

## 2025-03-24 PROCEDURE — 80048 BASIC METABOLIC PNL TOTAL CA: CPT

## 2025-03-24 PROCEDURE — 85652 RBC SED RATE AUTOMATED: CPT

## 2025-03-31 ENCOUNTER — HOSPITAL ENCOUNTER (OUTPATIENT)
Dept: HOSPITAL 100 - LABSPEC | Age: 65
Discharge: HOME | End: 2025-03-31
Payer: COMMERCIAL

## 2025-03-31 DIAGNOSIS — T84.53XA: ICD-10-CM

## 2025-03-31 DIAGNOSIS — E10.9: ICD-10-CM

## 2025-03-31 DIAGNOSIS — E78.2: Primary | ICD-10-CM

## 2025-03-31 LAB
ANION GAP: 13 (ref 5–15)
BUN SERPL-MCNC: 23 MG/DL (ref 4–19)
BUN/CREAT RATIO: 24.7 RATIO (ref 10–20)
CALCIUM SERPL-MCNC: 9.5 MG/DL (ref 7.6–11)
CARBON DIOXIDE: 22.2 MMOL/L (ref 21–32)
CHLORIDE: 101 MMOL/L (ref 98–108)
DEPRECATED RDW RBC: 47.2 FL (ref 35.1–43.9)
ERYTHROCYTE [DISTWIDTH] IN BLOOD: 14.7 % (ref 11.6–14.6)
GLUCOSE: 83 MG/DL (ref 70–99)
HCT VFR BLD AUTO: 29.7 % (ref 40–54)
HEMOGLOBIN: 9.7 G/DL (ref 13–16.5)
HGB BLD-MCNC: 9.7 G/DL (ref 13–16.5)
MCV RBC: 87.6 FL (ref 80–94)
MEAN CORP HGB CONC: 32.7 G/DL (ref 32–36)
MEAN PLATELET VOL.: 11.8 FL (ref 6.2–12)
PLATELET # BLD: 205 K/MM3 (ref 150–450)
PLATELET COUNT: 205 K/MM3 (ref 150–450)
POTASSIUM: 4.4 MMOL/L (ref 3.3–5.1)
RBC # BLD AUTO: 3.39 M/MM3 (ref 4.6–6.2)
RBC DISTRIBUTION WIDTH CV: 14.7 % (ref 11.6–14.6)
RBC DISTRIBUTION WIDTH SD: 47.2 FL (ref 35.1–43.9)
WBC # BLD AUTO: 4.4 K/MM3 (ref 4.4–11)
WHITE BLOOD COUNT: 4.4 K/MM3 (ref 4.4–11)

## 2025-03-31 PROCEDURE — 84153 ASSAY OF PSA TOTAL: CPT

## 2025-03-31 PROCEDURE — G0103 PSA SCREENING: HCPCS

## 2025-03-31 PROCEDURE — 80048 BASIC METABOLIC PNL TOTAL CA: CPT

## 2025-03-31 PROCEDURE — 83036 HEMOGLOBIN GLYCOSYLATED A1C: CPT

## 2025-03-31 PROCEDURE — 85027 COMPLETE CBC AUTOMATED: CPT

## 2025-03-31 PROCEDURE — 80061 LIPID PANEL: CPT

## 2025-03-31 PROCEDURE — 85652 RBC SED RATE AUTOMATED: CPT

## 2025-04-04 LAB
CHOLEST SERPL-MCNC: 147 MG/DL (ref ?–200)
CHOLESTEROL:HDL RATIO SCREEN: 3.8
PSA,TOTAL - ANNUAL SCREEN: 3.88 NG/ML (ref 0.02–4)
TRIGLYCERIDES: 111 MG/DL
VLDLC SERPL-MCNC: 22 MG/DL (ref 5–40)

## 2025-04-07 ENCOUNTER — HOSPITAL ENCOUNTER (OUTPATIENT)
Dept: HOSPITAL 100 - LABSPEC | Age: 65
Discharge: HOME | End: 2025-04-07
Payer: COMMERCIAL

## 2025-04-07 DIAGNOSIS — T84.53XA: Primary | ICD-10-CM

## 2025-04-07 LAB
ANION GAP SERPL CALC-SCNC: 12 MMOL/L (ref 5–15)
BUN SERPL-MCNC: 26 MG/DL (ref 4–19)
BUN/CREAT SERPL: 23.9 RATIO (ref 10–20)
CALCIUM SERPL-MCNC: 9.3 MG/DL (ref 7.6–11)
CHLORIDE: 100 MMOL/L (ref 98–108)
CO2 BLDCV-SCNC: 23.7 MMOL/L (ref 21–32)
DEPRECATED RDW RBC: 46.5 FL (ref 35.1–43.9)
ERYTHROCYTE [DISTWIDTH] IN BLOOD: 14.6 % (ref 11.6–14.6)
GLUCOSE SERPL-MCNC: 127 MG/DL (ref 70–99)
HCT VFR BLD AUTO: 30.9 % (ref 40–54)
HGB BLD-MCNC: 9.8 G/DL (ref 13–16.5)
MCV RBC: 88.5 FL (ref 80–94)
MEAN CORP HGB CONC: 31.7 G/DL (ref 32–36)
MEAN PLATELET VOL.: 11.6 FL (ref 6.2–12)
PLATELET # BLD: 281 K/MM3 (ref 150–450)
POTASSIUM SPEC-MCNC: 4.4 MMOL/L (ref 3.3–5.1)
RBC # BLD AUTO: 3.49 M/MM3 (ref 4.6–6.2)
WBC # BLD AUTO: 2.7 K/MM3 (ref 4.4–11)

## 2025-04-07 PROCEDURE — 85652 RBC SED RATE AUTOMATED: CPT

## 2025-04-07 PROCEDURE — 80048 BASIC METABOLIC PNL TOTAL CA: CPT

## 2025-04-07 PROCEDURE — 85027 COMPLETE CBC AUTOMATED: CPT

## 2025-04-14 ENCOUNTER — HOSPITAL ENCOUNTER (OUTPATIENT)
Dept: HOSPITAL 100 - HHLAB | Age: 65
LOS: 16 days | Discharge: HOME | End: 2025-04-30
Payer: COMMERCIAL

## 2025-04-14 DIAGNOSIS — T84.54XA: Primary | ICD-10-CM

## 2025-04-14 LAB
ANION GAP SERPL CALC-SCNC: 15 MMOL/L (ref 5–15)
BUN SERPL-MCNC: 25 MG/DL (ref 4–19)
BUN/CREAT SERPL: 20.8 RATIO (ref 10–20)
CALCIUM SERPL-MCNC: 9.1 MG/DL (ref 7.6–11)
CHLORIDE: 99 MMOL/L (ref 98–108)
CO2 BLDCV-SCNC: 22.5 MMOL/L (ref 21–32)
DEPRECATED RDW RBC: 47.8 FL (ref 35.1–43.9)
ERYTHROCYTE [DISTWIDTH] IN BLOOD: 15.1 % (ref 11.6–14.6)
GLUCOSE SERPL-MCNC: 131 MG/DL (ref 70–99)
HCT VFR BLD AUTO: 31.3 % (ref 40–54)
HGB BLD-MCNC: 9.8 G/DL (ref 13–16.5)
MCV RBC: 88.7 FL (ref 80–94)
MEAN CORP HGB CONC: 31.3 G/DL (ref 32–36)
MEAN PLATELET VOL.: 10.9 FL (ref 6.2–12)
PLATELET # BLD: 258 K/MM3 (ref 150–450)
POTASSIUM SPEC-MCNC: 4.2 MMOL/L (ref 3.3–5.1)
RBC # BLD AUTO: 3.53 M/MM3 (ref 4.6–6.2)
WBC # BLD AUTO: 8.3 K/MM3 (ref 4.4–11)

## 2025-04-14 PROCEDURE — 85652 RBC SED RATE AUTOMATED: CPT

## 2025-04-14 PROCEDURE — 80048 BASIC METABOLIC PNL TOTAL CA: CPT

## 2025-04-14 PROCEDURE — 85027 COMPLETE CBC AUTOMATED: CPT
